# Patient Record
Sex: MALE | Employment: OTHER | URBAN - METROPOLITAN AREA
[De-identification: names, ages, dates, MRNs, and addresses within clinical notes are randomized per-mention and may not be internally consistent; named-entity substitution may affect disease eponyms.]

---

## 2022-07-27 NOTE — PROGRESS NOTES
Cassidy Green (: 1960) is a 57 y.o. male, patient, here for evaluation of the following chief complaint(s):  Knee Pain (Right)       HPI:    He began having increased right knee pain approximately 1 year ago. The patient states that his pain came on gradually and reports no specific injury. He describes his pain is extremely severe, sharp, throbbing, burning, and constant. He has been experiencing some swelling and bruising in his right knee. The patient states that his pain continues to get worse. He reports that lying in bed, kneeling, stairs, and sitting makes pain worse. He has been taking gabapentin with little to no pain relief. The patient was not seen in the emergency room for his right knee pain and reports no previous or related right knee surgery. Not on File    No current outpatient medications on file. No current facility-administered medications for this visit. History reviewed. No pertinent past medical history. History reviewed. No pertinent surgical history. History reviewed. No pertinent family history.      Social History     Socioeconomic History    Marital status: SINGLE     Spouse name: Not on file    Number of children: Not on file    Years of education: Not on file    Highest education level: Not on file   Occupational History    Not on file   Tobacco Use    Smoking status: Not on file    Smokeless tobacco: Not on file   Substance and Sexual Activity    Alcohol use: Not on file    Drug use: Not on file    Sexual activity: Not on file   Other Topics Concern    Not on file   Social History Narrative    Not on file     Social Determinants of Health     Financial Resource Strain: Not on file   Food Insecurity: Not on file   Transportation Needs: Not on file   Physical Activity: Not on file   Stress: Not on file   Social Connections: Not on file   Intimate Partner Violence: Not on file   Housing Stability: Not on file       Review of Systems   All other systems reviewed and are negative. Vitals:  Ht 5' 10\" (1.778 m)   Wt 197 lb (89.4 kg)   BMI 28.27 kg/m²    Body mass index is 28.27 kg/m². Ortho Exam     The patient is well-developed and well-nourished. The patient presents today in alert and oriented x3 with a normal mood and affect. The patient stands with a normal weightbearing line but walks with a slightly antalgic gait because of his right knee pain. Right knee, the patient is tender to palpation along the medial joint line, and has an effusion. The patient has discomfort with Tyler´s maneuvers, and the knee is stable. They lack full flexion secondary to the effusion, but have full extension. They have 5/5 strength, and are neurovascularly intact distally. There is no erythema, warmth or skin lesions present. ASSESSMENT/PLAN:      1. Bilateral chronic knee pain  -     XR KNEES BI MIN 4 V; Future  2. Right medial knee pain  3. Knee meniscus pain, right  4. Tear of medial meniscus of right knee, initial encounter  -     MRI KNEE RT WO CONT; Future     XR Results (most recent):  Results from Appointment encounter on 08/02/22    XR KNEES BI MIN 4 V    Narrative  4 view x-rays of the knees show no evidence of a fracture or dislocation. There is no evidence of any significant degenerative disease. Below is the assessment and plan developed based on review of pertinent history, physical exam, labs, studies, and medications. We discussed the patient's right knee pain and his signs, symptoms, physical exam, description of his pain, and x-rays are consistent with a medial meniscus tear.   The possible treatment options were discussed with the patient and because of the over 1 year long duration of his increased pain, no improvement with multiple modalities of conservative management including an at-home exercise program, his physical exam, description of his pain, x-rays, and his inability to complete daily living activities without significant discomfort we elected to obtain an MRI of his right knee to further evaluate the severity of his medial meniscus tear. The MRI images and results will be used in preoperative planning if and almost certainly when surgical intervention is necessary. The risks and benefits of the MRI were discussed in detail with the patient and he would like to proceed. We will schedule this at his convenience. I will see him back after his MRI is complete to discuss the images, results, and further treatment options. In the interim, I did encourage him to continue to ice and elevate when possible, his activity level based on his bilateral knee pain, and use anti-inflammatory medication when necessary. The patient will work on range of motion, strengthening, and stretching exercises with an at-home exercise program as pain tolerates. He is to avoid any deep knee bend activities against resistance, squatting, kneeling, stairs, lunging, and high impact loading activities. I will see him back as noted above after his right knee MRI is complete. **We will obtain an MRI for more information to determine the best treatment plan moving forward and help us prepare for surgical intervention if necessary. **    Return for After his right knee MRI is complete. An electronic signature was used to authenticate this note.   -- Kerry Muhammad MD

## 2022-08-02 ENCOUNTER — OFFICE VISIT (OUTPATIENT)
Dept: ORTHOPEDIC SURGERY | Age: 62
End: 2022-08-02
Payer: OTHER GOVERNMENT

## 2022-08-02 DIAGNOSIS — M25.561 KNEE MENISCUS PAIN, RIGHT: ICD-10-CM

## 2022-08-02 DIAGNOSIS — G89.29 BILATERAL CHRONIC KNEE PAIN: Primary | ICD-10-CM

## 2022-08-02 DIAGNOSIS — M25.562 BILATERAL CHRONIC KNEE PAIN: Primary | ICD-10-CM

## 2022-08-02 DIAGNOSIS — M25.561 BILATERAL CHRONIC KNEE PAIN: Primary | ICD-10-CM

## 2022-08-02 DIAGNOSIS — M25.561 RIGHT MEDIAL KNEE PAIN: ICD-10-CM

## 2022-08-02 DIAGNOSIS — S83.241A TEAR OF MEDIAL MENISCUS OF RIGHT KNEE, INITIAL ENCOUNTER: ICD-10-CM

## 2022-08-02 PROCEDURE — 99203 OFFICE O/P NEW LOW 30 MIN: CPT | Performed by: ORTHOPAEDIC SURGERY

## 2022-08-03 VITALS — BODY MASS INDEX: 28.2 KG/M2 | HEIGHT: 70 IN | WEIGHT: 197 LBS

## 2022-08-16 ENCOUNTER — OFFICE VISIT (OUTPATIENT)
Dept: ORTHOPEDIC SURGERY | Age: 62
End: 2022-08-16
Payer: OTHER GOVERNMENT

## 2022-08-16 DIAGNOSIS — G89.29 CHRONIC PAIN OF RIGHT KNEE: Primary | ICD-10-CM

## 2022-08-16 DIAGNOSIS — G89.29 CHRONIC PATELLOFEMORAL PAIN OF RIGHT KNEE: ICD-10-CM

## 2022-08-16 DIAGNOSIS — M22.41 PATELLA, CHONDROMALACIA, RIGHT: ICD-10-CM

## 2022-08-16 DIAGNOSIS — M25.561 RIGHT MEDIAL KNEE PAIN: ICD-10-CM

## 2022-08-16 DIAGNOSIS — M23.306 MENISCUS DEGENERATION, RIGHT: ICD-10-CM

## 2022-08-16 DIAGNOSIS — M25.561 CHRONIC PATELLOFEMORAL PAIN OF RIGHT KNEE: ICD-10-CM

## 2022-08-16 DIAGNOSIS — M25.561 CHRONIC PAIN OF RIGHT KNEE: Primary | ICD-10-CM

## 2022-08-16 PROCEDURE — 99214 OFFICE O/P EST MOD 30 MIN: CPT | Performed by: ORTHOPAEDIC SURGERY

## 2022-08-16 RX ORDER — METHYLPREDNISOLONE 4 MG/1
TABLET ORAL
Qty: 1 DOSE PACK | Refills: 0 | Status: SHIPPED | OUTPATIENT
Start: 2022-08-16

## 2022-08-16 NOTE — PROGRESS NOTES
Rima Beach (: 1960) is a 57 y.o. male, patient, here for evaluation of the following chief complaint(s):  Knee Pain (Right, mri results/)       HPI:    He was last seen for his right knee pain on 8/3/2022. Since then, the patient did have an MRI performed on his right knee 2022. The patient states that his pain level is worse than it was at his last visit. He rates the severity of his right knee pain as a 10 out of 10. He describes his pain as throbbing, aching, burning, and constant. He has been experiencing some tingling and weakness in his right knee. The patient reports taking no medication for his discomfort. Right knee MRI images and results were independently reviewed and they were consistent with small partial-thickness fissure in the medial patellar facet. Areas of degenerative signal in the menisci without evidence of a tear. Allergies   Allergen Reactions    Penicillin Unable to Obtain       Current Outpatient Medications   Medication Sig    methylPREDNISolone (MEDROL DOSEPACK) 4 mg tablet Per dose pack instructions     No current facility-administered medications for this visit. History reviewed. No pertinent past medical history. History reviewed. No pertinent surgical history. History reviewed. No pertinent family history.      Social History     Socioeconomic History    Marital status: SINGLE     Spouse name: Not on file    Number of children: Not on file    Years of education: Not on file    Highest education level: Not on file   Occupational History    Not on file   Tobacco Use    Smoking status: Not on file    Smokeless tobacco: Not on file   Substance and Sexual Activity    Alcohol use: Not on file    Drug use: Not on file    Sexual activity: Not on file   Other Topics Concern    Not on file   Social History Narrative    Not on file     Social Determinants of Health     Financial Resource Strain: Not on file   Food Insecurity: Not on file Transportation Needs: Not on file   Physical Activity: Not on file   Stress: Not on file   Social Connections: Not on file   Intimate Partner Violence: Not on file   Housing Stability: Not on file       Review of Systems   All other systems reviewed and are negative. Vitals: There were no vitals taken for this visit. There is no height or weight on file to calculate BMI. Ortho Exam     The patient is well-developed and well-nourished. The patient presents today in alert and oriented x3 with a normal mood and affect. The patient stands with a normal weightbearing line but walks with a slightly antalgic gait because of his right knee pain. Right knee, the patient is nontender to palpation along the medial and lateral joint lines, and has no effusion. They are tender to palpation along the medial and lateral facets of the patella. They have crepitus of the patellofemoral joint with range of motion and discomfort with patella grind testing. The patient has no discomfort with Tyler´s maneuvers, and the knee is stable. They have full range of motion. They have 5/5 strength, and are neurovascularly intact distally. There is no erythema, warmth or skin lesions present. ASSESSMENT/PLAN:      1. Chronic pain of right knee  2. Chronic patellofemoral pain of right knee  3. Right medial knee pain  -     methylPREDNISolone (MEDROL DOSEPACK) 4 mg tablet; Per dose pack instructions, Normal, Disp-1 Dose Pack, R-0  4. Patella, chondromalacia, right  5. Meniscus degeneration, right       Below is the assessment and plan developed based on review of pertinent history, physical exam, labs, studies, and medications. We discussed the patient's ongoing and worsening right knee pain and we independently reviewed his MRI images and results and they were consistent with a small partial-thickness fissure in the medial patellar facet. Areas of degenerative signal in the menisci without evidence of a tear.   The possible treatment options were discussed with the patient and we elected to treat his pain with rest, ice, elevation, activity modification, and a Medrol Dosepak. The patient was given a prescription for Medrol Dosepak which he will use as directed. While taking his Medrol Dosepak, the patient will discontinue use of anti-inflammatory medication. Once his Medrol Dosepak is complete, the patient will resume anti-inflammatories at that time. The patient will work on range of motion, strengthening, and stretching exercises with an at-home exercise program as pain tolerates. He is to avoid any deep knee bend activities against resistance, squatting, kneeling, stairs, lunging, and high impact loading activities. I will see him back in 4 weeks for reevaluation if he continues to have persistence of his pain however, if his pain has improved and is well-maintained I will see him back on an as-needed basis at which point we will discuss alternative treatment options. Return in about 4 weeks (around 9/13/2022), or if symptoms worsen or fail to improve. An electronic signature was used to authenticate this note.   -- Jacob Douglas MD